# Patient Record
Sex: MALE | Race: OTHER | NOT HISPANIC OR LATINO | Employment: UNEMPLOYED
[De-identification: names, ages, dates, MRNs, and addresses within clinical notes are randomized per-mention and may not be internally consistent; named-entity substitution may affect disease eponyms.]

---

## 2022-04-06 ENCOUNTER — OFFICE VISIT (OUTPATIENT)
Dept: INTERNAL MEDICINE | Facility: OTHER | Age: 62
End: 2022-04-06
Payer: COMMERCIAL

## 2022-04-06 VITALS
SYSTOLIC BLOOD PRESSURE: 138 MMHG | TEMPERATURE: 98.4 F | DIASTOLIC BLOOD PRESSURE: 89 MMHG | HEIGHT: 71 IN | WEIGHT: 148.8 LBS | OXYGEN SATURATION: 96 % | BODY MASS INDEX: 20.83 KG/M2 | HEART RATE: 65 BPM

## 2022-04-06 DIAGNOSIS — Z13.9 ENCOUNTER FOR HEALTH-RELATED SCREENING: ICD-10-CM

## 2022-04-06 DIAGNOSIS — Z00.00 ENCOUNTER FOR PREVENTIVE HEALTH EXAMINATION: ICD-10-CM

## 2022-04-06 PROCEDURE — 99396 PREV VISIT EST AGE 40-64: CPT | Mod: GE | Performed by: STUDENT IN AN ORGANIZED HEALTH CARE EDUCATION/TRAINING PROGRAM

## 2022-04-06 RX ORDER — CLOTRIMAZOLE 1 %
CREAM (GRAM) TOPICAL
COMMUNITY
Start: 2014-04-22 | End: 2022-04-06

## 2022-04-06 RX ORDER — AZITHROMYCIN 250 MG/1
TABLET, FILM COATED ORAL
COMMUNITY
Start: 2014-04-22 | End: 2022-04-06

## 2022-04-06 ASSESSMENT — PATIENT HEALTH QUESTIONNAIRE - PHQ9: CLINICAL INTERPRETATION OF PHQ2 SCORE: 0

## 2022-04-06 NOTE — PROGRESS NOTES
"Subjective:     CC:   Chief Complaint   Patient presents with   • Annual Exam       HPI:   Trung Cole is a 62 y.o. male who presents for annual exam    Last Colorectal Cancer Screenin  Last Tdap: ?  Received HPV series: No  Hx STDs: No    Exercise: moderate regular exercise, aerobic < 3 days a week  Diet: Well balanced, does not eat red meat.      He  has no past medical history of Unspecified asthma(493.90).  He  has no past surgical history on file.    No family history on file.  Social History     Tobacco Use   • Smoking status: Never Smoker   • Smokeless tobacco: Never Used   Substance Use Topics   • Alcohol use: Never   • Drug use: Never     He  has no history on file for sexual activity.    There are no problems to display for this patient.    No current outpatient medications on file.     No current facility-administered medications for this visit.     No Known Allergies    Review of Systems   Constitutional: Negative for fever, chills and malaise/fatigue.   HENT: Negative for congestion.    Eyes: Negative for pain.   Respiratory: Negative for cough and shortness of breath.    Cardiovascular: Negative for chest pain and leg swelling.   Gastrointestinal: Negative for nausea, vomiting, abdominal pain and diarrhea.   Genitourinary: Negative for dysuria and hematuria.   Skin: Negative for rash.   Neurological: Negative for dizziness, focal weakness and headaches.   Endo/Heme/Allergies: Does not bruise/bleed easily.   Psychiatric/Behavioral: Negative for depression.  The patient is not nervous/anxious.      Objective:   /89 (BP Location: Left arm, Patient Position: Sitting, BP Cuff Size: Adult)   Pulse 65   Temp 36.9 °C (98.4 °F) (Temporal)   Ht 1.803 m (5' 11\")   Wt 67.5 kg (148 lb 12.8 oz)   SpO2 96%   BMI 20.75 kg/m²      Wt Readings from Last 4 Encounters:   22 67.5 kg (148 lb 12.8 oz)   14 70.3 kg (155 lb)         Physical Exam:  Constitutional: Well-developed and " well-nourished. Not diaphoretic. No distress.   Skin: Skin is warm and dry. No rash noted.  Head: Atraumatic without lesions.  Eyes: Conjunctivae and extraocular motions are normal. Pupils are equal, round, and reactive to light. No scleral icterus.   Ears:  External ears unremarkable. Tympanic membranes clear and intact.  Nose: Nares patent. Septum midline. Turbinates without erythema nor edema. No discharge.   Mouth/Throat: Tongue normal. Oropharynx is clear and moist. Posterior pharynx without erythema or exudates.  Neck: Supple, trachea midline. Normal range of motion. No thyromegaly present. No lymphadenopathy--cervical or supraclavicular.  Cardiovascular: Regular rate and rhythm, S1 and S2 without murmur, rubs, or gallops.    Respiratory: Effort normal. Clear to auscultation throughout. No adventitious sounds.   Abdomen: Soft, non tender, and without distention. Active bowel sounds in all four quadrants. No rebound, guarding, masses or HSM.    Extremities: No cyanosis, clubbing, erythema, nor edema. Distal pulses intact and symmetric.   Musculoskeletal: All major joints AROM full in all directions without pain.  Neurological: Alert and oriented x 3. Grossly non-focal. Strength and sensation grossly intact. DTRs 2+/3 and symmetric.   Psychiatric:  Behavior, mood, and affect are appropriate.      Assessment and Plan:     1. Encounter for preventive health examination    2. Encounter for health-related screening  - Comp Metabolic Panel; Future  - Lipid Profile; Future  - HEMOGLOBIN A1C; Future    Mr. Nino is a 61 y/o male with no known medical problems, doses not take any medications. He presents today for annual examination.  He stated feeling great, he does not have any complaints at all, he has not been sick or visit ED or UC in years.  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  Physical exam was very unremarkable.  Only observation that I had to make to patient was that his BP was  slightly elevated today 138/89, which might be due to clinic settings, he does not have HTN but he does not check his BP at home at all either. I recommended to monitor his BP in the next few months at least 2-3 times per week.    Health maintenance:  Ordered CMP, lipid panel, A1c.  Labs per orders  Immunizations per orders  Patient counseled about skin care, diet, supplements, and exercise.  Discussed colorectal cancer screening   Patient declined Colonoscopy. Patient stated that due to covid pandemia he would like to get procedure done in the future maybe next year. I explained to him that it is safe to get it done, but patient still declined.     Follow-up: Return in about 6 months (around 10/6/2022).

## 2022-04-06 NOTE — PATIENT INSTRUCTIONS
- Get lab work fasting  - Follow up with pcp in 6 months  - Monitor BP at home twice a week  - Continue regular exercise, diet rich in vegetables and fruits, avoid or limit red meat.

## 2022-10-17 ENCOUNTER — OFFICE VISIT (OUTPATIENT)
Dept: INTERNAL MEDICINE | Facility: OTHER | Age: 62
End: 2022-10-17
Payer: COMMERCIAL

## 2022-10-17 VITALS
DIASTOLIC BLOOD PRESSURE: 69 MMHG | TEMPERATURE: 97.4 F | HEIGHT: 71 IN | OXYGEN SATURATION: 99 % | BODY MASS INDEX: 20.27 KG/M2 | WEIGHT: 144.8 LBS | SYSTOLIC BLOOD PRESSURE: 116 MMHG | HEART RATE: 63 BPM

## 2022-10-17 DIAGNOSIS — E53.8 B12 DEFICIENCY: ICD-10-CM

## 2022-10-17 DIAGNOSIS — Z11.59 ENCOUNTER FOR HEPATITIS C SCREENING TEST FOR LOW RISK PATIENT: ICD-10-CM

## 2022-10-17 DIAGNOSIS — R63.4 WEIGHT LOSS: ICD-10-CM

## 2022-10-17 DIAGNOSIS — Z23 INFLUENZA VACCINATION ADMINISTERED AT CURRENT VISIT: ICD-10-CM

## 2022-10-17 DIAGNOSIS — Z78.9 VEGETARIAN: ICD-10-CM

## 2022-10-17 DIAGNOSIS — R73.03 PREDIABETES: ICD-10-CM

## 2022-10-17 DIAGNOSIS — Z11.4 SCREENING FOR HIV (HUMAN IMMUNODEFICIENCY VIRUS): ICD-10-CM

## 2022-10-17 DIAGNOSIS — Z11.3 SCREEN FOR STD (SEXUALLY TRANSMITTED DISEASE): ICD-10-CM

## 2022-10-17 PROCEDURE — 99214 OFFICE O/P EST MOD 30 MIN: CPT | Mod: GC | Performed by: STUDENT IN AN ORGANIZED HEALTH CARE EDUCATION/TRAINING PROGRAM

## 2022-10-17 NOTE — PROGRESS NOTES
Teaching Physician Attestation      Level of Participation    I have personally interviewed and examined the patient.  In addition, I discussed with the resident physician the patient's history, exam, assessment and plan in detail.  Topics listed in my addendum were the focus of the visit.  Healthcare maintenance was not addressed this visit unless listed as a topic in my addendum.  I agree with the plan as written along with the following additions/modifications:      New Res PCP    PMH: no pmh  Diet: dairy but otherwise vegan        Weight loss 2/2 healthy diet and yoga  -6 pounds in 3 months lost, nl bmi.  Praised pt.  Healthy eating handout given.  Patinet is restricive eating only dairy otherwise vegan, check b12.  No b sx, lad per Dr. Fernandez note.    Prediabetes and dietary counseling  -as above    Cell Phone Radiation Exposure Question  -Patient inquiring about cell phone safety and risk of cancer, discussed that there is conflicting evidence with some observational study showing an increased risk and others not.  Discussed that there is also a moving target given new networks with potentially higher amounts of radiofrequency waves.  Reviewed that in theory radiofrequency waves are nonionizing radiation, although they do generate heat.  Discussed that keeping distance from transmitters such as cell phones and routers by using hands-free devices such as corded devices or speaker phones is likely the safest way to reduce exposure.    Will screen for hep c, hiv, rpr.    Flu shot today.  Rec shingles vac at pharmacy.  Rec bivalent covid booster.    Return to clinic in 3 months.  Patient wishes to review difference between chemicals in glass and plastic leaching into water, can discuss then or sooner if he would like.

## 2022-10-17 NOTE — PROGRESS NOTES
"      Office Visit Follow up    Chief Complaint   Patient presents with    Follow-Up    Weight Loss     Pts wife states he is losing weight.     Lab Results       Subjective   Today, pt endorses an intentional 7lb weight loss over 3 months. He states that this could be because he started doing Yoga 5 months ago and that he feels \"better than ever before,\" but he is coming for further evaluation at the advice of his wife. He denies any hair loss or hot flashes. He also denies any fever/sweats/fatigue/changes in appetite/diarrhea.       ROS   -Cardio: denies chest pain, denies palpitations  -Resp: denies SOB, denies cough, denies wheezing  -Abd: denies abd pain, denies N/V    /69 (BP Location: Left arm, Patient Position: Sitting, BP Cuff Size: Adult)   Pulse 63   Temp 36.3 °C (97.4 °F) (Temporal)   Ht 1.803 m (5' 11\")   Wt 65.7 kg (144 lb 12.8 oz)   SpO2 99%   BMI 20.20 kg/m²     Physical Exam   -General: NAD, converses well  -ENT: Mallampati = 2 (STOP BANG = 2, low risk), no cervical lymphadenopathy  -Cardio: no murmurs or gallops  -Resp: lungs CTAB, symmetric expansion  -Abd: soft and nontender, no guarding or rebound      Data   -Cr = .86   -Albumin = 4.3     -ASCVD (2022) = 6%  -A1c (2022) = 5.7%    Note: I have reviewed all pertinent labs and diagnostic tests associated with this visit with specific comments listed under the assessment and plan below    Assessment and Plan  Trung Cole is a 62 year old male Gas  with a h/o preDM2 (A1c = 5.7% in 2022).    He presented with mild weight loss. Hep C/HIV/RPR/B12 level = pending.    -----------------------------------------------------------------------------------------------------------------------------------------------------------------------------------------------------------  #Mild Weight Loss  (Most likely benign since this was intentional. Pt has no red flags such as hair loss. However, Hyperthyroidism is still " possible. Lack of diarrhea or melena makes IBS and malignancy unlikely)    #preDM2  -pt is pursuing diet modifications  -pt is exercising frequently  -repeat A1c due ~February 2023      #Preventative Health Care  -COVID vaccine = obtained  -Flu vaccine = obtained  -next A1c = due ~ due ~February 2023  -next Lipid panel = due ~2027  -next colonoscopy = due ~2027  -next TD booster = due ~2031  -shingles vaccine = recommended  -pt IS a vegetarian  -HIV x1 = pending  -Hep C x1 = pending      Code Status = Full Code    Followup: 3 months      Monroe Fernandez, PGY2  [facility]    Pt has been seen and discussed with the Attending Physician.

## 2023-09-18 ENCOUNTER — APPOINTMENT (OUTPATIENT)
Dept: INTERNAL MEDICINE | Facility: OTHER | Age: 63
End: 2023-09-18
Payer: COMMERCIAL

## 2023-09-22 ENCOUNTER — OFFICE VISIT (OUTPATIENT)
Dept: INTERNAL MEDICINE | Facility: OTHER | Age: 63
End: 2023-09-22
Payer: COMMERCIAL

## 2023-09-22 DIAGNOSIS — M79.605 PAIN IN BOTH LOWER EXTREMITIES: ICD-10-CM

## 2023-09-22 DIAGNOSIS — R73.03 PREDIABETES: ICD-10-CM

## 2023-09-22 DIAGNOSIS — M79.604 PAIN IN BOTH LOWER EXTREMITIES: ICD-10-CM

## 2023-09-22 DIAGNOSIS — Z00.00 PREVENTATIVE HEALTH CARE: ICD-10-CM

## 2023-09-22 PROCEDURE — 99214 OFFICE O/P EST MOD 30 MIN: CPT | Mod: GC | Performed by: STUDENT IN AN ORGANIZED HEALTH CARE EDUCATION/TRAINING PROGRAM

## 2023-09-22 NOTE — PROGRESS NOTES
"      Office Visit Follow up    CC = calf pain    Subjective   Pt states that he occasionally has a mild bilateral \"dull\" calf pain which happens after standing for a long time at work. The pain is worse with movement and resolved with a massage. He has no pain now. He denies any N/T/weakness in his LE.      ROS   -for breakfast, pt eats a biscuit. For lunch he eats tortillas and beans/salad. For dinner he eats salad. He jogs for about 1 hr/week.     Vitals:  -T = 96.8   -HR = 73   -BP = 107/73   -SPO2 = 100%   -Wt = 148.4    Physical Exam   -General: NAD, converses well  -Cardio: no murmurs or gallops  -Resp: lungs CTAB, symmetric expansion  -Abd: soft and nontender, no guarding or rebound  -MSK: no calf tenderness. LE with full strength/ROM and sensation is intact to crude touch     Data   -Cr = .86   -Albumin = 4.3      -HIV/hep C = neg  -RPR = neg   -B12 = wnl   -ASCVD (2022) = 6%  -A1c (2022) = 5.7%     Note: I have reviewed all pertinent labs and diagnostic tests associated with this visit with specific comments listed under the assessment and plan below     Assessment and Plan  Trung Cole is a 62 year old male Gas  with a h/o preDM2 (A1c = 5.7% in 2022).     He presented with calf pain. A1c = pending.     -----------------------------------------------------------------------------------------------------------------------------------------------------------------------------------------------------------  #Bilateral Calf Pain  (Mild. Most likely normal fatigue from standing for long hours)  -encouraged taking breaks intermittently   -no plans for further w/u or management     #Mild Weight Loss  (Most likely benign since this was intentional. Pt has no red flags such as hair loss. However, Hyperthyroidism is still possible. Lack of diarrhea or melena makes IBS and malignancy unlikely)     #preDM2  -pt is pursuing diet modifications  -pt is exercising frequently  -repeat A1c = " pending        #Preventative Health Care  -COVID vaccine = obtained  -Flu vaccine = declined  -next A1c = pending  -next Lipid panel = due ~2027  -next colonoscopy = due ~2027  -next TD booster = due ~2031  -shingles vaccine = recommended  -pt IS a vegetarian  -HIV x1 = done  -Hep C x1 = done      Code Status = Full Code     Followup: 3 months        Monroe Fernandez, PGY3  [facility]     Pt has been seen and discussed with the Attending Physician.

## 2025-05-16 ENCOUNTER — OFFICE VISIT (OUTPATIENT)
Dept: URGENT CARE | Facility: PHYSICIAN GROUP | Age: 65
End: 2025-05-16
Payer: COMMERCIAL

## 2025-05-16 VITALS
WEIGHT: 147.5 LBS | TEMPERATURE: 97.5 F | HEART RATE: 69 BPM | OXYGEN SATURATION: 99 % | BODY MASS INDEX: 21.11 KG/M2 | DIASTOLIC BLOOD PRESSURE: 66 MMHG | HEIGHT: 70 IN | RESPIRATION RATE: 16 BRPM | SYSTOLIC BLOOD PRESSURE: 108 MMHG

## 2025-05-16 DIAGNOSIS — L50.9 URTICARIA: Primary | ICD-10-CM

## 2025-05-16 PROCEDURE — 3074F SYST BP LT 130 MM HG: CPT | Performed by: FAMILY MEDICINE

## 2025-05-16 PROCEDURE — 99214 OFFICE O/P EST MOD 30 MIN: CPT | Performed by: FAMILY MEDICINE

## 2025-05-16 PROCEDURE — 3078F DIAST BP <80 MM HG: CPT | Performed by: FAMILY MEDICINE

## 2025-05-16 RX ORDER — TRIAMCINOLONE ACETONIDE 40 MG/ML
40 INJECTION, SUSPENSION INTRA-ARTICULAR; INTRAMUSCULAR ONCE
Status: COMPLETED | OUTPATIENT
Start: 2025-05-16 | End: 2025-05-16

## 2025-05-16 RX ADMIN — TRIAMCINOLONE ACETONIDE 40 MG: 40 INJECTION, SUSPENSION INTRA-ARTICULAR; INTRAMUSCULAR at 18:02

## 2025-05-16 ASSESSMENT — ENCOUNTER SYMPTOMS
CHILLS: 0
GASTROINTESTINAL NEGATIVE: 1
CARDIOVASCULAR NEGATIVE: 1
EYES NEGATIVE: 1
RESPIRATORY NEGATIVE: 1
FEVER: 0

## 2025-05-17 NOTE — PROGRESS NOTES
"Subjective:   Trung Cole is a 65 y.o. male who presents for Allergic Reaction (Pt been taking fish oil, stop for 1 wk, then after face is swollen, redness all over face, x 4 days.)      HPI    Review of Systems   Constitutional:  Negative for chills and fever.   HENT: Negative.     Eyes: Negative.    Respiratory: Negative.     Cardiovascular: Negative.    Gastrointestinal: Negative.    Genitourinary: Negative.    Skin:  Positive for rash.       Medications, Allergies, and current problem list reviewed today in Epic.     Objective:     /66 (BP Location: Right arm, Patient Position: Sitting, BP Cuff Size: Adult)   Pulse 69   Temp 36.4 °C (97.5 °F)   Resp 16   Ht 1.778 m (5' 10\")   Wt 66.9 kg (147 lb 8 oz)   SpO2 99%     Physical Exam  Vitals and nursing note reviewed.   Cardiovascular:      Rate and Rhythm: Normal rate and regular rhythm.      Pulses: Normal pulses.      Heart sounds: Normal heart sounds.   Pulmonary:      Effort: Pulmonary effort is normal.      Breath sounds: Normal breath sounds. No wheezing.   Skin:     Comments: Marked swelling and itching of face         Assessment/Plan:     Diagnosis and associated orders:     1. Urticaria  triamcinolone acetonide (Kenalog-40) injection 40 mg         Comments/MDM:              Differential diagnosis, natural history, supportive care, and indications for immediate follow-up discussed.    Advised the patient to follow-up with the primary care physician for recheck, reevaluation, and consideration of further management.    Please note that this dictation was created using voice recognition software. I have made a reasonable attempt to correct obvious errors, but I expect that there are errors of grammar and possibly content that I did not discover before finalizing the note.    This note was electronically signed by Ascencion Moreira M.D.  "

## 2025-06-03 ENCOUNTER — OFFICE VISIT (OUTPATIENT)
Dept: INTERNAL MEDICINE | Facility: OTHER | Age: 65
End: 2025-06-03
Payer: COMMERCIAL

## 2025-06-03 VITALS
TEMPERATURE: 97.2 F | HEIGHT: 70 IN | BODY MASS INDEX: 20.81 KG/M2 | SYSTOLIC BLOOD PRESSURE: 119 MMHG | WEIGHT: 145.4 LBS | OXYGEN SATURATION: 98 % | DIASTOLIC BLOOD PRESSURE: 77 MMHG | HEART RATE: 58 BPM

## 2025-06-03 DIAGNOSIS — Z76.89 ENCOUNTER TO ESTABLISH CARE: ICD-10-CM

## 2025-06-03 DIAGNOSIS — Z12.5 PROSTATE CANCER SCREENING: ICD-10-CM

## 2025-06-03 DIAGNOSIS — R73.03 PREDIABETES: Primary | ICD-10-CM

## 2025-06-03 PROBLEM — L50.9 URTICARIA: Status: ACTIVE | Noted: 2025-06-03

## 2025-06-03 PROCEDURE — 3074F SYST BP LT 130 MM HG: CPT | Mod: GC

## 2025-06-03 PROCEDURE — 3078F DIAST BP <80 MM HG: CPT | Mod: GC

## 2025-06-03 PROCEDURE — 99213 OFFICE O/P EST LOW 20 MIN: CPT | Mod: GC

## 2025-06-03 ASSESSMENT — PATIENT HEALTH QUESTIONNAIRE - PHQ9: CLINICAL INTERPRETATION OF PHQ2 SCORE: 0

## 2025-06-03 NOTE — PROGRESS NOTES
NEW PATIENT    Chief Complaint   Patient presents with    Establish Care     Re-establishing care- no new concerns        History of Present Illness:   Trung Cole is a 65 y.o. male who PMH as below who presents to establish care and for:  1. Prediabetes    2. Encounter to establish care    3. Prostate cancer screening      Patient was lost to follow-up and is here to establish with new physician.  He has no acute complaints but asked thoughtful questions about his diet and benefits of vitamins, all of which were answered in office.    #Pre-Diabetes  The patient reports a history of elevated blood sugar levels, noted to be barely in the pre-diabetes range. He emphasizes the importance of diet and exercise in managing his blood sugar levels and avoids high-carb foods such as white rice, white bread, and pastries.    Review of Systems:  Constitutional: Negative for chills, fever, and malaise/fatigue.  HEENT: Negative for congestion, nosebleeds, and sore throat.  Respiratory: Negative for cough, hemoptysis, sputum production, and shortness of breath.  Cardiovascular: Negative for chest pain, palpitations, and leg swelling.  Gastrointestinal: Negative for abdominal pain, blood in stool, constipation, diarrhea, melena, nausea, and vomiting.  Genitourinary: Negative for dysuria, frequency, hematuria, and urgency.  Musculoskeletal: Negative for falls and joint pain.  Skin: Negative for rash.  Neurological: Negative for dizziness, headaches, focal weakness, and loss of consciousness.  Psychiatric/Behavioral: Negative for depression and suicidal ideas.  All other systems reviewed and are negative.    Past Medical History:     Past Medical History[1]    Patient Active Problem List    Diagnosis Date Noted    Urticaria 06/03/2025    Pain of right lower extremity 09/22/2023    Vegetarian 10/17/2022    Weight loss (mild) 10/17/2022    Prediabetes 10/17/2022       Past Surgical History:   Past Surgical History[2]  "    Allergies:  Patient has no known allergies.    Medications:   No current outpatient medications on file.     Social History:     Social History[3]    Tobacco/Drugs/Alcohol: as above  Occupation:   Social: lives with wife  Sexual: active with one partner, no STI hx  Diet: Vegetarian    Family History:   No family history on file.    Objective:  Vitals:   /77 (BP Location: Left arm, Patient Position: Sitting, BP Cuff Size: Adult)   Pulse (!) 58   Temp 36.2 °C (97.2 °F) (Temporal)   Ht 1.778 m (5' 10\")   Wt 66 kg (145 lb 6.4 oz)   SpO2 98%  Body mass index is 20.86 kg/m².    PHYSICAL EXAM  General: Alert and oriented, in no acute distress, generally well-appearing.  HEENT: Normocephalic, atraumatic, extraocular movement intact, external ears normal, no rhinorrhea, moist mucous membranes, no pharyngeal edema or erythema.  Cardiovascular: Regular rate and rhythm, no murmurs or gallops.  Respiratory: No respiratory distress, no chest wall tenderness, breath sounds clear to auscultation bilaterally, without wheezing, rhonchi, or rales.  Abdominal: Soft, non-tender, non-distended, no organomegaly, normal bowel sounds.  Musculoskeletal: No deformity, swelling, or edema.  Skin: No lesion or rash.  Neurological: No focal deficits, no weakness, alert and oriented x4  Psychiatric: Appropriate mood and affect.    Assessment and Plan:    65 y.o. male with:     1. Prediabetes (Primary)  Last A1c 5.7%  - HEMOGLOBIN A1C; Future  - Comp Metabolic Panel; Future    2. Encounter to establish care  -Request previous medical records including colonoscopy    3. Prostate cancer screening  Asymptomatic  - PROSTATE SPECIFIC AG SCREENING; Future    #Preventative Health Care  -COVID vaccine = obtained  -Flu vaccine = declined  -next A1c = pending  -next Lipid panel = due ~2027  -next colonoscopy = due ~2027  -next TD booster = due ~2031  -shingles vaccine = recommended  -pt IS a vegetarian  -HIV x1 = done  -Hep C x1 = done "   -PSA = pending    Follow Up:  Return in about 1 year (around 6/3/2026) for Wellness visit.  -Recommended PCV  -Review lab work    Jason Ordoñez MD  Internal Medicine PGY-2  [facility]         [1] No past medical history on file.  [2] No past surgical history on file.  [3]   Social History  Tobacco Use    Smoking status: Never     Passive exposure: Never    Smokeless tobacco: Never   Vaping Use    Vaping status: Never Used   Substance Use Topics    Alcohol use: Never    Drug use: Never

## 2025-06-03 NOTE — PATIENT INSTRUCTIONS
"Thank you for visiting our office. As discussed, here is the plan to address your health issues.     PLAN:  If you do not hear back about your appointment in 3 business days, call [facility] at [phone] or our office to follow-up.   - Go to lab at least 5 days prior to your next visit to get bloodwork completed (if \"fasting labs\" are ordered, don't eat the day of your blood draw)   - Make a follow-up appointment with our office in 3 months    Please try and eat healthy, get at least 30 minutes of cardiovascular exercise a day to help keep your health as best as it can be. If you feel like you are experiencing a medical emergency please seek immediate medical attention at an urgent care or in the emergency department.    Jason Ordoñez MD    \"Pneumococcal Vaccine\" for anyone over 50  "

## 2025-07-16 ENCOUNTER — OFFICE VISIT (OUTPATIENT)
Dept: URGENT CARE | Facility: PHYSICIAN GROUP | Age: 65
End: 2025-07-16
Payer: COMMERCIAL

## 2025-07-16 ENCOUNTER — HOSPITAL ENCOUNTER (OUTPATIENT)
Dept: LAB | Facility: MEDICAL CENTER | Age: 65
End: 2025-07-16
Payer: COMMERCIAL

## 2025-07-16 VITALS
WEIGHT: 146.8 LBS | DIASTOLIC BLOOD PRESSURE: 76 MMHG | HEART RATE: 56 BPM | OXYGEN SATURATION: 98 % | SYSTOLIC BLOOD PRESSURE: 118 MMHG | TEMPERATURE: 97.7 F | RESPIRATION RATE: 18 BRPM | BODY MASS INDEX: 21.02 KG/M2 | HEIGHT: 70 IN

## 2025-07-16 DIAGNOSIS — Z12.5 PROSTATE CANCER SCREENING: ICD-10-CM

## 2025-07-16 DIAGNOSIS — R73.03 PREDIABETES: ICD-10-CM

## 2025-07-16 DIAGNOSIS — T78.3XXA ANGIOEDEMA, INITIAL ENCOUNTER: Primary | ICD-10-CM

## 2025-07-16 PROCEDURE — 99214 OFFICE O/P EST MOD 30 MIN: CPT

## 2025-07-16 PROCEDURE — 3078F DIAST BP <80 MM HG: CPT

## 2025-07-16 PROCEDURE — 3074F SYST BP LT 130 MM HG: CPT

## 2025-07-16 RX ORDER — DIPHENHYDRAMINE HCL 25 MG
25 TABLET ORAL EVERY 6 HOURS PRN
COMMUNITY

## 2025-07-16 RX ORDER — CETIRIZINE HYDROCHLORIDE 10 MG/1
10 TABLET ORAL DAILY
Qty: 30 TABLET | Refills: 0 | Status: SHIPPED | OUTPATIENT
Start: 2025-07-16

## 2025-07-16 RX ORDER — FAMOTIDINE 20 MG/1
20 TABLET, FILM COATED ORAL 2 TIMES DAILY
Qty: 28 TABLET | Refills: 0 | Status: SHIPPED | OUTPATIENT
Start: 2025-07-16 | End: 2025-07-30

## 2025-07-16 RX ORDER — PREDNISONE 10 MG/1
TABLET ORAL
Qty: 32 TABLET | Refills: 0 | Status: SHIPPED | OUTPATIENT
Start: 2025-07-16 | End: 2025-07-26

## 2025-07-16 RX ORDER — EPINEPHRINE 0.3 MG/.3ML
0.3 INJECTION SUBCUTANEOUS ONCE
Qty: 1 EACH | Refills: 0 | Status: SHIPPED | OUTPATIENT
Start: 2025-07-16 | End: 2025-07-16

## 2025-07-16 ASSESSMENT — ENCOUNTER SYMPTOMS
PHOTOPHOBIA: 0
STRIDOR: 0
VOMITING: 0
SPUTUM PRODUCTION: 0
DIARRHEA: 0
SHORTNESS OF BREATH: 0
DIZZINESS: 0
EYE PAIN: 0
WHEEZING: 0
CHILLS: 0
HEADACHES: 0
ABDOMINAL PAIN: 0
BACK PAIN: 0
FEVER: 0
NERVOUS/ANXIOUS: 0
BLURRED VISION: 0
EYE REDNESS: 0
COUGH: 0
MYALGIAS: 0
EYE DISCHARGE: 0
DOUBLE VISION: 0
NECK PAIN: 0
SORE THROAT: 0
NAUSEA: 0
PALPITATIONS: 0

## 2025-07-16 NOTE — PROGRESS NOTES
Subjective:   Trung Cole is a 65 y.o. male who presents for Facial Swelling noticed last night.)          I introduced myself to the patient and informed them that I am a Family Nurse Practitioner.    HPI:Trung is a 65 year-old male who comes in today c/o painless swelling of his face cheeks and around his eyes. Onset was noticed the onset was last night.  Has been getting worse today.  Patient describes symptoms as constant. They describe the pain as none. Aggravating factors include none. Relieving factors include none. Treatments tried at home include Benadryl 25 mg p.o. last night with little relief. They describe their symptoms as moderate.  He denies any urticaria, difficulty swallowing, difficulty breathing, wheezing, swelling of his lips tongue throat, fever, chills, nausea or vomiting.  Denies starting any new medications or supplements recently.  States he currently does not take any prescription medications.  He cannot identify any triggering factors, states he has had this before, most recently was 05/16/2025 when he was seen at urgent care with urticarial rash as well as angioedema, was treated with a triamcinolone shot      Review of Systems   Constitutional:  Negative for chills, fever and malaise/fatigue.   HENT:  Negative for congestion, ear pain and sore throat.    Eyes:  Negative for blurred vision, double vision, photophobia, pain, discharge and redness.   Respiratory:  Negative for cough, sputum production, shortness of breath, wheezing and stridor.    Cardiovascular:  Negative for chest pain and palpitations.   Gastrointestinal:  Negative for abdominal pain, diarrhea, nausea and vomiting.   Genitourinary:  Negative for dysuria.   Musculoskeletal:  Negative for back pain, myalgias and neck pain.   Skin:  Negative for rash.        Positive for periorbital face cheek swelling   Neurological:  Negative for dizziness and headaches.   Endo/Heme/Allergies:  Negative for environmental  "allergies.   Psychiatric/Behavioral:  The patient is not nervous/anxious.        Medications: reviewed with patient, updated med sheet    Allergies: Fish oil    Problem List: does not have any pertinent problems on file.    Surgical History:  No past surgical history on file.    Past Social Hx:   reports that he has never smoked. He has never been exposed to tobacco smoke. He has never used smokeless tobacco. He reports that he does not drink alcohol and does not use drugs.     Past Family Hx:   family history is not on file.     Problem list, medications, and allergies reviewed by myself today in Epic.   I have documented what I find to be significant in regards to past medical, social, family and surgical history  in my HPI or under PMH/PSH/FH review section, otherwise it is noncontributory     Objective:     /76 (BP Location: Right arm, Patient Position: Sitting, BP Cuff Size: Adult)   Pulse (!) 56   Temp 36.5 °C (97.7 °F)   Resp 18   Ht 1.778 m (5' 10\")   Wt 66.6 kg (146 lb 12.8 oz)   SpO2 98%   BMI 21.06 kg/m²     During this visit, appropriate PPE was worn, and hand hygiene was performed.    Physical Exam  Vitals reviewed.   Constitutional:       General: He is not in acute distress.     Appearance: Normal appearance. He is not ill-appearing or toxic-appearing.   HENT:      Head: Normocephalic and atraumatic.      Comments: There is angioedema noted with swelling of bilateral face cheeks and periorbital areas.  There is no erythema, edema, induration, warmth to touch, lymphangitis or any other sign of infection.  No urticaria noted.     Right Ear: Tympanic membrane, ear canal and external ear normal. There is no impacted cerumen.      Left Ear: Tympanic membrane, ear canal and external ear normal. There is no impacted cerumen.      Nose: No congestion or rhinorrhea.      Mouth/Throat:      Pharynx: Oropharynx is clear. No oropharyngeal exudate or posterior oropharyngeal erythema.      Comments: No " tonsillar swelling, bilaterally.  No soft tissue swelling of the sublingual mucosa, no petechia or swelling of the soft or hard palate, no unilarteral oropharynx swelling, no sign of tonsillar stone, epiglottitis, or abscess.  Airway is patent and there is no stridor.  Patient is managing oral secretions appropriately.  Uvula is midline and appropriate size with no erythema or edema.   Eyes:      General: No scleral icterus.        Right eye: No discharge.         Left eye: No discharge.      Conjunctiva/sclera: Conjunctivae normal.      Pupils: Pupils are equal, round, and reactive to light.   Cardiovascular:      Rate and Rhythm: Normal rate and regular rhythm.      Heart sounds: Normal heart sounds. No murmur heard.     No friction rub. No gallop.   Pulmonary:      Effort: Pulmonary effort is normal. No respiratory distress.      Breath sounds: Normal breath sounds. No stridor. No wheezing, rhonchi or rales.   Abdominal:      General: There is no distension.   Musculoskeletal:         General: Normal range of motion.      Cervical back: Normal range of motion. No rigidity.      Right lower leg: No edema.      Left lower leg: No edema.   Lymphadenopathy:      Cervical: No cervical adenopathy.   Skin:     General: Skin is warm and dry.      Coloration: Skin is not jaundiced.   Neurological:      General: No focal deficit present.      Mental Status: He is alert and oriented to person, place, and time. Mental status is at baseline.   Psychiatric:         Mood and Affect: Mood normal.         Behavior: Behavior normal.         Thought Content: Thought content normal.         Judgment: Judgment normal.         Assessment/Plan:     Diagnosis and associated orders:     1. Angioedema, initial encounter  predniSONE (DELTASONE) 10 MG Tab    cetirizine (ZYRTEC ALLERGY) 10 MG Tab    famotidine (PEPCID) 20 MG Tab    EPINEPHrine (EPIPEN) 0.3 MG/0.3ML Solution Auto-injector solution for injection    Referral to Allergy          Comments/MDM:     1. Angioedema, initial encounter (Primary)  Patient's presentation and symptoms as well as physical exam are consistent with angioedema with unknown cause.  No urticaria, no respiratory distress, wheezing, difficulty swallowing, or swelling of lips tongue throat.  Vital signs are stable in clinic presently, patient is breathing normally with normal respiratory effort, normal phonation no difficulty swallowing.  States he is unable to identify the trigger, but has had a similar episode a couple months ago  The only allergy he has he is aware of is to fish oil  We will treat him with a 10-day prednisone taper today, have him take 10 mg of Zyrtec daily, Pepcid 20 mg twice a day for the next 14 days.  Given that this is his second episode in a couple months, I also sent an EpiPen to his pharmacy today, discussed at length with him how and when to use this, including if he does need to use it he should go immediately to the ER afterwards.  Will make him a urgent referral to allergist for further workup.  discussed with patient Dx,  DDx, management options (risks,benefits, and alternatives to planned treatment), natural progression.   Supportive care measures were discussed.   Questions were encouraged and answered.   Written information was provided and I did go over this with the patient in clinic today.  Instructed patient regarding red flags and to return to urgent care prn if new or worsening sx or if there is no improvement in condition prn.    Advised the patient to follow-up with the primary care physician for recheck, reevaluation, and consideration of further management.  I did instruct patient regarding medications prescribed, purpose, side effects, precautions.  Instructed patient to get a pharmacy consult when picking up any prescribed medications.  Patient was instructed regarding prednisone, purpose, side effects, precautions, include avoid using any other NSAIDs while taking prednisone,  take it first thing in the morning to avoid potential sleeplessness/insomnia, take with food to prevent GI upset.     Strict ER precautions discussed for any worsening symptoms, chest pain, difficulty breathing, difficulty swallowing, or if he has to use his EpiPen   Patient states they have good understanding and they are agreeable with the plan of care.     - predniSONE (DELTASONE) 10 MG Tab; Take 6 Tablets by mouth every day for 2 days, THEN 4 Tablets every day for 2 days, THEN 3 Tablets every day for 2 days, THEN 2 Tablets every day for 2 days, THEN 1 Tablet every day for 2 days.  Dispense: 32 Tablet; Refill: 0  - cetirizine (ZYRTEC ALLERGY) 10 MG Tab; Take 1 Tablet by mouth every day.  Dispense: 30 Tablet; Refill: 0  - famotidine (PEPCID) 20 MG Tab; Take 1 Tablet by mouth 2 times a day for 14 days.  Dispense: 28 Tablet; Refill: 0  - EPINEPHrine (EPIPEN) 0.3 MG/0.3ML Solution Auto-injector solution for injection; Inject 0.3 mL into the shoulder, thigh, or buttocks one time for 1 dose.  Dispense: 1 Each; Refill: 0  - Referral to Allergy           Pt is clinically stable at today's acute urgent care visit. Vital signs are normal and reassuring.  No acute distress noted. There was no immediate clinical indication for the necessity of emergency department evaluation or inpatient admission.  Appropriate for outpatient management at this time. Patient was amendable to a trial of outpatient management.        I personally reviewed prior external notes and test results pertinent to today's visit.  I have independently reviewed and interpreted all diagnostics ordered during this urgent care acute visit.        Please note that this dictation was created using voice recognition software. I have made a reasonable attempt to correct obvious errors, but I expect that there are errors of grammar and possibly content that I did not discover before finalizing the note.    This note was electronically signed by Perry Stahl  APRN, FNP-BC, CWS, CFCN

## 2025-07-17 NOTE — Clinical Note
REFERRAL APPROVAL NOTICE         Sent on July 17, 2025                   Trung Cole  [address] [place] [address]                   Dear Mr. Vidal Cole,    After a careful review of the medical information and benefit coverage, [facility] has processed your referral. See below for additional details.    If applicable, you must be actively enrolled with your insurance for coverage of the authorized service. If you have any questions regarding your coverage, please contact your insurance directly.    REFERRAL INFORMATION   Referral #:  [id number]  Referred-To Provider    Referred-By Provider:  Allergy and Immunology    MIKAL Campuzano BORIS M      [address]  [place] [address]  [phone] [id number]  [address]  [place] [address]  [phone]    Referral Start Date:  07/16/2025  Referral End Date:   07/16/2026             SCHEDULING  If you do not already have an appointment, please call [phone] to make an appointment.     MORE INFORMATION  If you do not already have a [employer] account, sign up at: [email or web address]  You can access your medical information, make appointments, see lab results, billing information, and more.  If you have questions regarding this referral, please contact  the [facility] Referrals department at:             [phone]. Monday - Friday 8:00AM - 5:00PM.     Sincerely,    [facility]